# Patient Record
Sex: FEMALE | Race: WHITE | NOT HISPANIC OR LATINO | URBAN - METROPOLITAN AREA
[De-identification: names, ages, dates, MRNs, and addresses within clinical notes are randomized per-mention and may not be internally consistent; named-entity substitution may affect disease eponyms.]

---

## 2024-09-04 ENCOUNTER — APPOINTMENT (RX ONLY)
Dept: URBAN - METROPOLITAN AREA CLINIC 162 | Facility: CLINIC | Age: 29
Setting detail: DERMATOLOGY
End: 2024-09-04

## 2024-09-04 DIAGNOSIS — D485 NEOPLASM OF UNCERTAIN BEHAVIOR OF SKIN: ICD-10-CM

## 2024-09-04 PROBLEM — D48.5 NEOPLASM OF UNCERTAIN BEHAVIOR OF SKIN: Status: ACTIVE | Noted: 2024-09-04

## 2024-09-04 PROCEDURE — ? CONSULTATION FOR EXCISION

## 2024-09-04 PROCEDURE — 99203 OFFICE O/P NEW LOW 30 MIN: CPT | Mod: 25

## 2024-09-04 PROCEDURE — 11104 PUNCH BX SKIN SINGLE LESION: CPT

## 2024-09-04 PROCEDURE — ? BIOPSY BY PUNCH METHOD

## 2024-09-04 ASSESSMENT — LOCATION SIMPLE DESCRIPTION DERM
LOCATION SIMPLE: ABDOMEN
LOCATION SIMPLE: POSTERIOR SCALP
LOCATION SIMPLE: ANTERIOR SCALP

## 2024-09-04 ASSESSMENT — LOCATION ZONE DERM
LOCATION ZONE: SCALP
LOCATION ZONE: TRUNK

## 2024-09-04 ASSESSMENT — LOCATION DETAILED DESCRIPTION DERM
LOCATION DETAILED: SUBXIPHOID
LOCATION DETAILED: MID-FRONTAL SCALP
LOCATION DETAILED: LEFT POSTERIOR PARIETAL SCALP

## 2024-09-04 NOTE — PROCEDURE: BIOPSY BY PUNCH METHOD

## 2024-09-04 NOTE — HPI: OTHER
"              After Visit Summary   2/16/2018    Dmitri Mcguire    MRN: [id number]           Patient Information     Date Of Birth          1992        Visit Information        Provider Department      2/16/2018 4:00 PM [facility]        Today's Diagnoses     Allergic rhinitis    -  1       Follow-ups after your visit        Your next 10 appointments already scheduled     Feb 23, 2018  4:00 PM CST   Nurse Only with [facility] ([facility])    [address] [address]   [phone]           Every allergy patient MUST wait 30 minutes after their allergy shot. No exceptions.  Xolair shots #1-3 should plan to wait 2 hours in clinic Xolair shots after #4 should plan 30 minute wait in clinic              Who to contact     If you have questions or need follow up information about today's clinic visit or your schedule please contact [facility] directly at [phone].  Normal or non-critical lab and imaging results will be communicated to you by [facility], letter or phone within 4 business days after the clinic has received the results. If you do not hear from us within 7 days, please contact the clinic through [employer] or phone. If you have a critical or abnormal lab result, we will notify you by phone as soon as possible.  Submit refill requests through [employer] or call your pharmacy and they will forward the refill request to us. Please allow 3 business days for your refill to be completed.          Additional Information About Your Visit        [facility] [employer] Information     [employer] lets you send messages to your doctor, view your test results, renew your prescriptions, schedule appointments and more. To sign up, go to [email or web address] . Click on \"Log in\" on the left side of the screen, which will take you to the Welcome page. Then click on \"Sign up Now\" on the right side of the page.     You will "
Condition:: Spots of concerns
Please Describe Your Condition:: Scalp x2 and under left breast \\nPatient states the lesion under left breast has drained with pressure applied\\nPatient c/o pain the lesion on posterior scalp\\nPatient states the lesion on the frontal scalp is newer \\nPatient wants to know what her options are
be asked to enter the access code listed below, as well as some personal information. Please follow the directions to create your username and password.     Your access code is: [email or web address]  Expires: 2018  4:40 PM     Your access code will  in 90 days. If you need help or a new code, please call your [place] clinic or [phone].        Care EveryWhere ID     This is your Care EveryWhere ID. This could be used by other organizations to access your [place] medical records  [id number]         Blood Pressure from Last 3 Encounters:   17 117/78   17 124/78   17 131/84    Weight from Last 3 Encounters:   17 77 kg (169 lb 12.1 oz)   17 76.6 kg (168 lb 14.4 oz)   17 77.4 kg (170 lb 10.2 oz)              We Performed the Following     Allergy Shot: Two or more injections        Primary Care Provider Office Phone # Fax #    [facility] [phone] [address] [address]        Equal Access to Services     EDUIN PENNINGTON : Hadii aad ku hadasho Soxeniaali, waaxda luqadaha, qaybta kaalmada adeegyada, josiah umanzor hayfriedan dennis rahman . So [facility] [phone].    ATENCIÓN: Si habla español, tiene a spencer disposición servicios gratuitos de asistencia lingüística. Llame al [phone].    We comply with applicable federal civil rights laws and [place] laws. We do not discriminate on the basis of race, color, national origin, age, disability, sex, sexual orientation, or gender identity.            Thank you!     Thank you for choosing [facility]  for your care. Our goal is always to provide you with excellent care. Hearing back from our patients is one way we can continue to improve our services. Please take a few minutes to complete the written survey that you may receive in the mail after your visit with us. Thank you!             Your Updated Medication List - Protect others around you: Learn how to safely 
use, store and throw away your medicines at www.disposemymeds.org.          This list is accurate as of 2/16/18  4:51 PM.  Always use your most recent med list.                   Brand Name Dispense Instructions for use Diagnosis    ADVIL 200 MG capsule   Generic drug:  ibuprofen      Take 200 mg by mouth every 4 hours as needed Reported on 4/28/2017        albuterol 108 (90 BASE) MCG/ACT Inhaler    PROAIR HFA/PROVENTIL HFA/VENTOLIN HFA     Inhale 2 puffs into the lungs every 4 hours as needed for shortness of breath / dyspnea or wheezing        * ALLERGEN IMMUNOTHERAPY PRESCRIPTION     5 mL    Name of Mix: Mix #1  Cat, Dog Cat Hair, Standardized 10,000 BAU/mL, ALK  2.0 ml Dog Hair Dander, A. P.  1:100 w/v, HS  1.0 ml  Diluent: HSA qs to 5ml    Seasonal allergic rhinitis due to pollen, Non-seasonal allergic rhinitis due to animal hair and dander, Chronic allergic conjunctivitis, Need for desensitization to allergens       * ALLERGEN IMMUNOTHERAPY PRESCRIPTION     5 mL    Name of Mix: Mix #2  Tree  Birch Mix GLY 1:20 w/v, HS  0.5 ml [place]Maple  Mix BHR (Boxelder Hard Red) 1:20 w/v, HS  0.5 ml [place] Mix GLY 1:20 w/v, HS 0.5 ml Oak Mix RVW GLY 1:20 w/v, HS 0.5 ml [place] Tree, Black GLY 1:20 w/v, HS 0.5 ml [place], Black GLY 1:20 w/v, HS 0.5 ml Diluent: HSA qs to 5ml    Chronic seasonal allergic rhinitis due to pollen, Non-seasonal allergic rhinitis due to animal hair and dander, Chronic allergic conjunctivitis, Need for desensitization to allergens       EPINEPHrine 0.3 MG/0.3ML injection 2-pack    AUVI-Q    0.6 mL    Inject 0.3 mLs (0.3 mg) into the muscle as needed for anaphylaxis    Seasonal allergic rhinitis due to pollen, Non-seasonal allergic rhinitis due to animal hair and dander       fluticasone 50 MCG/ACT spray    FLONASE    1 Bottle    Spray 2 sprays into both nostrils daily    Atopic rhinitis       * Notice:  This list has 2 medication(s) that are the same as other medications prescribed for you. Read 
the directions carefully, and ask your doctor or other care provider to review them with you.